# Patient Record
Sex: MALE | URBAN - METROPOLITAN AREA
[De-identification: names, ages, dates, MRNs, and addresses within clinical notes are randomized per-mention and may not be internally consistent; named-entity substitution may affect disease eponyms.]

---

## 2019-04-23 ENCOUNTER — APPOINTMENT (RX ONLY)
Dept: URBAN - METROPOLITAN AREA CLINIC 162 | Facility: CLINIC | Age: 39
Setting detail: DERMATOLOGY
End: 2019-04-23

## 2019-04-23 DIAGNOSIS — D485 NEOPLASM OF UNCERTAIN BEHAVIOR OF SKIN: ICD-10-CM

## 2019-04-23 DIAGNOSIS — Z71.89 OTHER SPECIFIED COUNSELING: ICD-10-CM

## 2019-04-23 DIAGNOSIS — D22 MELANOCYTIC NEVI: ICD-10-CM

## 2019-04-23 DIAGNOSIS — L81.4 OTHER MELANIN HYPERPIGMENTATION: ICD-10-CM

## 2019-04-23 PROBLEM — F32.9 MAJOR DEPRESSIVE DISORDER, SINGLE EPISODE, UNSPECIFIED: Status: ACTIVE | Noted: 2019-04-23

## 2019-04-23 PROBLEM — F41.9 ANXIETY DISORDER, UNSPECIFIED: Status: ACTIVE | Noted: 2019-04-23

## 2019-04-23 PROBLEM — D48.5 NEOPLASM OF UNCERTAIN BEHAVIOR OF SKIN: Status: ACTIVE | Noted: 2019-04-23

## 2019-04-23 PROBLEM — D22.4 MELANOCYTIC NEVI OF SCALP AND NECK: Status: ACTIVE | Noted: 2019-04-23

## 2019-04-23 PROCEDURE — 69100 BIOPSY OF EXTERNAL EAR: CPT

## 2019-04-23 PROCEDURE — ? BIOPSY BY SHAVE METHOD

## 2019-04-23 PROCEDURE — ? COUNSELING

## 2019-04-23 PROCEDURE — 99203 OFFICE O/P NEW LOW 30 MIN: CPT | Mod: 25

## 2019-04-23 PROCEDURE — 11102 TANGNTL BX SKIN SINGLE LES: CPT | Mod: 59

## 2019-04-23 ASSESSMENT — LOCATION SIMPLE DESCRIPTION DERM
LOCATION SIMPLE: LEFT ANTERIOR NECK
LOCATION SIMPLE: LEFT ZYGOMA
LOCATION SIMPLE: POSTERIOR NECK
LOCATION SIMPLE: LEFT EAR

## 2019-04-23 ASSESSMENT — LOCATION DETAILED DESCRIPTION DERM
LOCATION DETAILED: LEFT CLAVICULAR NECK
LOCATION DETAILED: LEFT SUPERIOR HELIX
LOCATION DETAILED: LEFT LATERAL ZYGOMA
LOCATION DETAILED: RIGHT MEDIAL TRAPEZIAL NECK

## 2019-04-23 ASSESSMENT — LOCATION ZONE DERM
LOCATION ZONE: FACE
LOCATION ZONE: EAR
LOCATION ZONE: NECK

## 2019-08-28 ENCOUNTER — APPOINTMENT (RX ONLY)
Dept: URBAN - METROPOLITAN AREA CLINIC 162 | Facility: CLINIC | Age: 39
Setting detail: DERMATOLOGY
End: 2019-08-28

## 2019-08-28 DIAGNOSIS — Z71.89 OTHER SPECIFIED COUNSELING: ICD-10-CM

## 2019-08-28 DIAGNOSIS — D22 MELANOCYTIC NEVI: ICD-10-CM

## 2019-08-28 DIAGNOSIS — B07.8 OTHER VIRAL WARTS: ICD-10-CM

## 2019-08-28 PROBLEM — D22.39 MELANOCYTIC NEVI OF OTHER PARTS OF FACE: Status: ACTIVE | Noted: 2019-08-28

## 2019-08-28 PROCEDURE — ? INVENTORY

## 2019-08-28 PROCEDURE — ? COUNSELING

## 2019-08-28 PROCEDURE — 99213 OFFICE O/P EST LOW 20 MIN: CPT | Mod: 25

## 2019-08-28 PROCEDURE — ? BENIGN DESTRUCTION

## 2019-08-28 PROCEDURE — 17110 DESTRUCTION B9 LES UP TO 14: CPT

## 2019-08-28 ASSESSMENT — LOCATION SIMPLE DESCRIPTION DERM
LOCATION SIMPLE: LEFT CHEEK
LOCATION SIMPLE: RIGHT FOREHEAD
LOCATION SIMPLE: LEFT TEMPLE
LOCATION SIMPLE: LEFT ZYGOMA

## 2019-08-28 ASSESSMENT — LOCATION DETAILED DESCRIPTION DERM
LOCATION DETAILED: LEFT INFERIOR PREAURICULAR CHEEK
LOCATION DETAILED: LEFT MID PREAURICULAR CHEEK
LOCATION DETAILED: LEFT LATERAL ZYGOMA
LOCATION DETAILED: RIGHT FOREHEAD
LOCATION DETAILED: LEFT LATERAL TEMPLE

## 2019-08-28 ASSESSMENT — LOCATION ZONE DERM
LOCATION ZONE: FACE
LOCATION ZONE: FACE

## 2019-08-28 NOTE — PROCEDURE: BENIGN DESTRUCTION
Total Number Of Lesions Treated: 5
Add 52 Modifier (Optional): no
Consent: The patient's consent was obtained including but not limited to risks of crusting, scabbing, blistering, scarring, darker or lighter pigmentary change, recurrence, incomplete removal and infection.
Medical Necessity Information: It is in your best interest to select a reason for this procedure from the list below. All of these items fulfill various CMS LCD requirements except the new and changing color options.
Medical Necessity Clause: This procedure was medically necessary because the lesions that were treated were:
Anesthesia Volume In Cc: 0
Post-Care Instructions: I reviewed with the patient in detail post-care instructions. Patient is to wear sunprotection, and avoid picking at any of the treated lesions. Pt may apply Vaseline to crusted or scabbing areas.
Detail Level: Zone

## 2020-04-27 ENCOUNTER — APPOINTMENT (RX ONLY)
Dept: URBAN - METROPOLITAN AREA CLINIC 162 | Facility: CLINIC | Age: 40
Setting detail: DERMATOLOGY
End: 2020-04-27

## 2020-04-27 DIAGNOSIS — D485 NEOPLASM OF UNCERTAIN BEHAVIOR OF SKIN: ICD-10-CM

## 2020-04-27 DIAGNOSIS — Z71.89 OTHER SPECIFIED COUNSELING: ICD-10-CM

## 2020-04-27 PROBLEM — D48.5 NEOPLASM OF UNCERTAIN BEHAVIOR OF SKIN: Status: ACTIVE | Noted: 2020-04-27

## 2020-04-27 PROCEDURE — ? COUNSELING

## 2020-04-27 PROCEDURE — 99212 OFFICE O/P EST SF 10 MIN: CPT

## 2020-04-27 PROCEDURE — ? DEFER

## 2020-04-27 PROCEDURE — ? ADDITIONAL NOTES

## 2020-04-27 ASSESSMENT — LOCATION SIMPLE DESCRIPTION DERM: LOCATION SIMPLE: RIGHT UPPER ARM

## 2020-04-27 ASSESSMENT — LOCATION ZONE DERM: LOCATION ZONE: ARM

## 2020-04-27 ASSESSMENT — LOCATION DETAILED DESCRIPTION DERM: LOCATION DETAILED: RIGHT PROXIMAL POSTERIOR UPPER ARM

## 2020-04-30 ENCOUNTER — APPOINTMENT (RX ONLY)
Dept: URBAN - METROPOLITAN AREA CLINIC 162 | Facility: CLINIC | Age: 40
Setting detail: DERMATOLOGY
End: 2020-04-30

## 2020-04-30 DIAGNOSIS — D485 NEOPLASM OF UNCERTAIN BEHAVIOR OF SKIN: ICD-10-CM

## 2020-04-30 DIAGNOSIS — L57.8 OTHER SKIN CHANGES DUE TO CHRONIC EXPOSURE TO NONIONIZING RADIATION: ICD-10-CM

## 2020-04-30 PROBLEM — D48.5 NEOPLASM OF UNCERTAIN BEHAVIOR OF SKIN: Status: ACTIVE | Noted: 2020-04-30

## 2020-04-30 PROCEDURE — ? BIOPSY BY SHAVE METHOD

## 2020-04-30 PROCEDURE — 99213 OFFICE O/P EST LOW 20 MIN: CPT | Mod: 25

## 2020-04-30 PROCEDURE — ? COUNSELING

## 2020-04-30 PROCEDURE — 11103 TANGNTL BX SKIN EA SEP/ADDL: CPT

## 2020-04-30 PROCEDURE — ? ADDITIONAL NOTES

## 2020-04-30 PROCEDURE — 11102 TANGNTL BX SKIN SINGLE LES: CPT

## 2020-04-30 ASSESSMENT — LOCATION SIMPLE DESCRIPTION DERM
LOCATION SIMPLE: RIGHT UPPER ARM
LOCATION SIMPLE: ABDOMEN

## 2020-04-30 ASSESSMENT — LOCATION ZONE DERM
LOCATION ZONE: TRUNK
LOCATION ZONE: ARM

## 2020-04-30 ASSESSMENT — LOCATION DETAILED DESCRIPTION DERM
LOCATION DETAILED: RIGHT PROXIMAL LATERAL POSTERIOR UPPER ARM
LOCATION DETAILED: RIGHT LATERAL ABDOMEN

## 2020-09-28 ENCOUNTER — APPOINTMENT (RX ONLY)
Dept: URBAN - METROPOLITAN AREA CLINIC 162 | Facility: CLINIC | Age: 40
Setting detail: DERMATOLOGY
End: 2020-09-28

## 2020-09-28 DIAGNOSIS — L57.8 OTHER SKIN CHANGES DUE TO CHRONIC EXPOSURE TO NONIONIZING RADIATION: ICD-10-CM

## 2020-09-28 DIAGNOSIS — L29.89 OTHER PRURITUS: ICD-10-CM

## 2020-09-28 DIAGNOSIS — L29.8 OTHER PRURITUS: ICD-10-CM

## 2020-09-28 DIAGNOSIS — L23.7 ALLERGIC CONTACT DERMATITIS DUE TO PLANTS, EXCEPT FOOD: ICD-10-CM

## 2020-09-28 PROCEDURE — 99213 OFFICE O/P EST LOW 20 MIN: CPT | Mod: 25

## 2020-09-28 PROCEDURE — ? INJECTION

## 2020-09-28 PROCEDURE — ? PRESCRIPTION

## 2020-09-28 PROCEDURE — ? ADDITIONAL NOTES

## 2020-09-28 PROCEDURE — 96372 THER/PROPH/DIAG INJ SC/IM: CPT

## 2020-09-28 PROCEDURE — ? COUNSELING

## 2020-09-28 RX ORDER — PREDNISONE 10 MG/1
TABLET ORAL
Qty: 24 | Refills: 0 | Status: ERX | COMMUNITY
Start: 2020-09-28

## 2020-09-28 RX ADMIN — PREDNISONE: 10 TABLET ORAL at 00:00

## 2020-09-28 ASSESSMENT — LOCATION SIMPLE DESCRIPTION DERM
LOCATION SIMPLE: RIGHT THIGH
LOCATION SIMPLE: LEFT UPPER ARM
LOCATION SIMPLE: RIGHT BUTTOCK
LOCATION SIMPLE: LEFT PRETIBIAL REGION
LOCATION SIMPLE: RIGHT PRETIBIAL REGION
LOCATION SIMPLE: LEFT FOREARM
LOCATION SIMPLE: LEFT THIGH
LOCATION SIMPLE: RIGHT FOREARM
LOCATION SIMPLE: RIGHT UPPER ARM

## 2020-09-28 ASSESSMENT — LOCATION DETAILED DESCRIPTION DERM
LOCATION DETAILED: LEFT ANTERIOR DISTAL THIGH
LOCATION DETAILED: RIGHT ANTERIOR DISTAL THIGH
LOCATION DETAILED: LEFT PROXIMAL DORSAL FOREARM
LOCATION DETAILED: RIGHT DISTAL LATERAL POSTERIOR UPPER ARM
LOCATION DETAILED: RIGHT VENTRAL DISTAL FOREARM
LOCATION DETAILED: LEFT PROXIMAL PRETIBIAL REGION
LOCATION DETAILED: RIGHT BUTTOCK
LOCATION DETAILED: LEFT ANTERIOR DISTAL UPPER ARM
LOCATION DETAILED: RIGHT PROXIMAL PRETIBIAL REGION

## 2020-09-28 ASSESSMENT — LOCATION ZONE DERM
LOCATION ZONE: LEG
LOCATION ZONE: TRUNK
LOCATION ZONE: ARM

## 2020-09-28 NOTE — PROCEDURE: INJECTION
Bill J-Code: yes
Detail Level: None
Units: cc
Post-Care Instructions: I reviewed with the patient in detail post-care instructions. Patient understands to keep the injection sites clean and call the clinic if there is any redness, swelling or pain.
Dose Administered (Numbers Only): 0
Procedure Information: Please note that the numeric value listed in the Medication (1) and associated J-code units and Medication (2) and associated J-code units variables are j-code amounts and do not represent either the concentration or the total amount of the medications injected.  I strongly recommend selecting no to the Render J-code information in note question. This will allow your note to be more clear. If you are billing j-codes with your injection codes you need to document the total amount of the medication injected. This amount should match the j-code units. For example, if you are injecting Triamcinolone 40mg as an intramuscular injection you would select 40 for the dose field and mg for the units. This would allow you to document  with 4 units (40mg = 10mg x 4). The total volume is not used to calculate j-codes only the amount of the medication administered.
Medication (1) And Associated J-Code Units: Depomedrol, 40mg
Route: IM
Consent: The risks of the medication were reviewed with the patient.
Dose Administered (Numbers Only): 1
Hide Second Medication?: No

## 2022-04-05 NOTE — PROCEDURE: BIOPSY BY SHAVE METHOD
GERD DIET      Gastroesophageal Reflux disease, commonly referred to as GERD, occurs when the contents of the stomach come into contact with the lining of the esophagus. When this occurs, the acidic content of the stomach causes irritation and burning to the lining of the esophagus. This is commonly associated with heartburn, chronic cough, belching, bloating, or pain in the back of the breastbone.     Specific dietary modifications can be implemented to prevent these symptoms from occurring. Foods such as: garlic, onion, high fat foods, chocolate, caffeine, peppermint, spearmint, and alcohol should be avoided. These foods lower the pressure of the lower esophageal sphincter (LES), causing the acidic contents of the stomach to reflux into the esophagus.     Some foods can cause further irritation when heartburn currently exists. These foods, including: citrus fruits and juices, tomatoes, and peppers and should be avoided.     Additionally, it is important to limit the amount of food eaten at one time. Small, frequent meals are generally better tolerated than three large meals. Large meals tend to force the acidic contents of the stomach up into the esophagus, resulting in reflux. Smaller, lower fat meals are not in the stomach long enough to cause irritation.     Following these dietary modifications can:   Significantly decrease GERD symptoms.   Heal the esophagus from injury.   Manage and prevent complications of acid reflux.   Keep symptoms in remission.      Reference:    MABEL Kwong (2002) Tell Me What To Eat If I Have Acid Reflux.  [place], [place]:  [employer] [facility] [employer]                                SAMPLE MENU FOR GERD DIET: 1600 calories/day    Breakfast Snack Lunch Snack Supper   Whole grain cereal--1 cup  Skim milk--1/2 cup  Banana--1   Whole wheat toast--2 slices  Margarine--1 tsp. Medium apple Vegetable Soup--1 cup  [place]--4  Lean beef joon--3 oz.  Whole wheat roll--1  Reduced calorie herbert--1  tbsp.  Mustard--1 tbsp. Pretzels--2 oz. Grilled chicken breast--4 oz  Green salad--4 oz  Vinegar/Oil dressing--1 tbsp.  Herbed brown rice--1/2 cup  Broccoli--1/2 cup  Celery sticks--1/2 cup   Oatmeal--1 cup  Raisins--6  Whole grain English muffin--2 halves  Jam--1 tbsp.  Applesauce--1/2 cup  Decaffeinated coffee/tea--1 cup Will crackers--4  Smoked turkey--3 oz.  Whole grain tess--1  Low fat herbert--1 tbsp.  Lettuce--1/2 cup  Low fat cottage cheese--1/2 cup  Peaches--1 cup Low-fat frozen yogurt--1/2 cup Broiled salmon--3 oz.  Boiled red potatoes--1/2 cup  Steamed cauliflower--1 cup  Apricots--1 cup  Skim milk--1 cup   Bagel--1  Low-fat cream cheese or Peanut butter--1 tbsp  Banana--1  Skim milk--1 cup Red or Green Grapes--20 Whole grain tortilla--1  Tuna salad--3 oz.  Cucumber slices--1/2 cup  Lettuce--1/2 cup  Low-fat mozzarella cheese--1 oz. Papaya--1 cup Baked pork chops--3 oz.  Applesauce--1/2 cup  Baked potato--1 med.  Margarine--1 tbsp.  Green beans--1/2 cup                 GERD DIET: 1800 calories/day    Breakfast Snack Lunch Snack Supper   Whole grain cereal--1 cup  Skim milk--1/2 cup  Banana--1   Whole wheat toast--2 slices  Margarine--1 tsp. Medium apple Vegetable Soup--1 cup  Salt[place]--4  Lean beef joon--3 oz.  Whole wheat roll--1  Reduced calorie herbert--1 tbsp.  Mustard--1 tbsp.  Baby carrots--1/2 cup Pretzels--4 oz. Grilled chicken breast--4 oz  Green salad--4 oz  Vinegar/Oil dressing--1 tbsp.  Brown rice--1/2 cup  Broccoli--1/2 cup  Celery sticks--1/2 cup   Oatmeal--1 cup  Raisins--6  Whole grain English muffin--2 halves  Jam--1 tbsp.  Applesauce--1/2 cup  Decaffeinated coffee/tea--1 cup Will crackers--4  Skim milk--1 cup  Smoked turkey--3 oz.  Whole grain tess--1  Low fat herbert--1 tbsp.  Lettuce--1/2 cup  Low fat cottage cheese--1/2 cup  Peaches--1 cup Low-fat frozen yogurt--1/2 cup  Granola--1 oz. Broiled salmon--3 oz.  Boiled red potatoes--1/2 cup  Steamed cauliflower--1 cup  Apricots--1 cup  Skim  milk--1 cup  Strawberries--1/2 cup   Bagel--1  Low-fat cream cheese or Peanut butter--1 tbsp  Banana--1  Skim milk--1 cup Red or Green Grapes--20 Whole grain tortilla--1  Tuna salad--3 oz.  Cucumber slices--1/2 cup  Lettuce--1/2 cup  Low-fat mozzarella cheese--1 oz.  Papaya--1 cup Corn tortilla chips--2 oz. Baked pork chops--3 oz.  Applesauce--1/2 cup  Baked potato--1 medium  Margarine--1 tbsp.  Green beans--1/2 cup  Whole wheat roll--1           Dressing: Band-Aid